# Patient Record
Sex: MALE
[De-identification: names, ages, dates, MRNs, and addresses within clinical notes are randomized per-mention and may not be internally consistent; named-entity substitution may affect disease eponyms.]

---

## 2017-08-16 ENCOUNTER — RESULT CHARGE (OUTPATIENT)
Age: 62
End: 2017-08-16

## 2017-08-17 ENCOUNTER — RX RENEWAL (OUTPATIENT)
Age: 62
End: 2017-08-17

## 2017-08-17 ENCOUNTER — APPOINTMENT (OUTPATIENT)
Dept: HEART AND VASCULAR | Facility: CLINIC | Age: 62
End: 2017-08-17
Payer: COMMERCIAL

## 2017-08-17 ENCOUNTER — LABORATORY RESULT (OUTPATIENT)
Age: 62
End: 2017-08-17

## 2017-08-17 VITALS
WEIGHT: 274.56 LBS | DIASTOLIC BLOOD PRESSURE: 88 MMHG | TEMPERATURE: 98.8 F | HEART RATE: 73 BPM | HEIGHT: 73.62 IN | BODY MASS INDEX: 35.61 KG/M2 | OXYGEN SATURATION: 94 % | SYSTOLIC BLOOD PRESSURE: 152 MMHG

## 2017-08-17 DIAGNOSIS — F15.90 OTHER STIMULANT USE, UNSPECIFIED, UNCOMPLICATED: ICD-10-CM

## 2017-08-17 DIAGNOSIS — E11.9 TYPE 2 DIABETES MELLITUS W/OUT COMPLICATIONS: ICD-10-CM

## 2017-08-17 DIAGNOSIS — Z82.49 FAMILY HISTORY OF ISCHEMIC HEART DISEASE AND OTHER DISEASES OF THE CIRCULATORY SYSTEM: ICD-10-CM

## 2017-08-17 DIAGNOSIS — Z80.9 FAMILY HISTORY OF MALIGNANT NEOPLASM, UNSPECIFIED: ICD-10-CM

## 2017-08-17 PROCEDURE — 99214 OFFICE O/P EST MOD 30 MIN: CPT | Mod: 25

## 2017-08-17 PROCEDURE — 93000 ELECTROCARDIOGRAM COMPLETE: CPT

## 2017-08-17 PROCEDURE — 36415 COLL VENOUS BLD VENIPUNCTURE: CPT

## 2017-08-17 RX ORDER — ASPIRIN ENTERIC COATED TABLETS 81 MG 81 MG/1
81 TABLET, DELAYED RELEASE ORAL DAILY
Qty: 90 | Refills: 0 | Status: ACTIVE | COMMUNITY
Start: 2017-08-17

## 2017-08-17 RX ORDER — CHLORHEXIDINE GLUCONATE, 0.12% ORAL RINSE 1.2 MG/ML
0.12 SOLUTION DENTAL
Qty: 473 | Refills: 0 | Status: DISCONTINUED | COMMUNITY
Start: 2017-02-16

## 2017-08-17 RX ORDER — METFORMIN HYDROCHLORIDE 1000 MG/1
1000 TABLET, COATED ORAL
Qty: 180 | Refills: 3 | Status: ACTIVE | COMMUNITY
Start: 2017-05-23 | End: 1900-01-01

## 2017-08-17 RX ORDER — GLIPIZIDE 10 MG/1
10 TABLET, EXTENDED RELEASE ORAL
Qty: 180 | Refills: 3 | Status: ACTIVE | COMMUNITY
Start: 2017-05-23 | End: 1900-01-01

## 2017-08-18 ENCOUNTER — RX RENEWAL (OUTPATIENT)
Age: 62
End: 2017-08-18

## 2017-08-18 LAB
ALBUMIN SERPL ELPH-MCNC: 4.6 G/DL
ALP BLD-CCNC: 44 U/L
ALT SERPL-CCNC: 19 U/L
ANION GAP SERPL CALC-SCNC: 17 MMOL/L
APPEARANCE: CLEAR
AST SERPL-CCNC: 20 U/L
BASOPHILS # BLD AUTO: 0.04 K/UL
BASOPHILS NFR BLD AUTO: 0.5 %
BILIRUB SERPL-MCNC: 0.4 MG/DL
BILIRUBIN URINE: NEGATIVE
BLOOD URINE: NEGATIVE
BUN SERPL-MCNC: 33 MG/DL
CALCIUM SERPL-MCNC: 9.7 MG/DL
CHLORIDE SERPL-SCNC: 99 MMOL/L
CHOLEST SERPL-MCNC: 200 MG/DL
CHOLEST/HDLC SERPL: 3.7 RATIO
CO2 SERPL-SCNC: 25 MMOL/L
COLOR: YELLOW
CREAT SERPL-MCNC: 1.47 MG/DL
EOSINOPHIL # BLD AUTO: 0.09 K/UL
EOSINOPHIL NFR BLD AUTO: 1.2 %
GLUCOSE QUALITATIVE U: NORMAL MG/DL
GLUCOSE SERPL-MCNC: 119 MG/DL
HBA1C MFR BLD HPLC: 9.1 %
HCT VFR BLD CALC: 45.4 %
HDLC SERPL-MCNC: 54 MG/DL
HGB BLD-MCNC: 15.1 G/DL
IMM GRANULOCYTES NFR BLD AUTO: 0.3 %
KETONES URINE: NEGATIVE
LDLC SERPL CALC-MCNC: 125 MG/DL
LEUKOCYTE ESTERASE URINE: NEGATIVE
LYMPHOCYTES # BLD AUTO: 1.7 K/UL
LYMPHOCYTES NFR BLD AUTO: 22.5 %
MAN DIFF?: NORMAL
MCHC RBC-ENTMCNC: 31 PG
MCHC RBC-ENTMCNC: 33.3 GM/DL
MCV RBC AUTO: 93.2 FL
MONOCYTES # BLD AUTO: 0.47 K/UL
MONOCYTES NFR BLD AUTO: 6.2 %
NEUTROPHILS # BLD AUTO: 5.22 K/UL
NEUTROPHILS NFR BLD AUTO: 69.3 %
NITRITE URINE: NEGATIVE
PH URINE: 5
PLATELET # BLD AUTO: 175 K/UL
POTASSIUM SERPL-SCNC: 5.1 MMOL/L
PROT SERPL-MCNC: 7.6 G/DL
PROTEIN URINE: 100 MG/DL
PSA SERPL-MCNC: 1.37 NG/ML
RBC # BLD: 4.87 M/UL
RBC # FLD: 13 %
SODIUM SERPL-SCNC: 141 MMOL/L
SPECIFIC GRAVITY URINE: 1.02
T3 SERPL-MCNC: 96 NG/DL
T4 SERPL-MCNC: 7.8 UG/DL
TRIGL SERPL-MCNC: 107 MG/DL
TSH SERPL-ACNC: 1.49 UIU/ML
UROBILINOGEN URINE: NORMAL MG/DL
WBC # FLD AUTO: 7.54 K/UL

## 2018-03-02 ENCOUNTER — RX RENEWAL (OUTPATIENT)
Age: 63
End: 2018-03-02

## 2018-03-02 RX ORDER — PEN NEEDLE, DIABETIC 29 G X1/2"
32G X 4 MM NEEDLE, DISPOSABLE MISCELLANEOUS
Qty: 180 | Refills: 3 | Status: ACTIVE | COMMUNITY
Start: 2017-05-23

## 2018-03-15 ENCOUNTER — RX RENEWAL (OUTPATIENT)
Age: 63
End: 2018-03-15

## 2018-03-15 RX ORDER — INSULIN DEGLUDEC INJECTION 100 U/ML
100 INJECTION, SOLUTION SUBCUTANEOUS
Qty: 45 | Refills: 1 | Status: ACTIVE | COMMUNITY
Start: 2017-06-20 | End: 1900-01-01

## 2019-07-08 ENCOUNTER — RX RENEWAL (OUTPATIENT)
Age: 64
End: 2019-07-08

## 2020-07-22 ENCOUNTER — NON-APPOINTMENT (OUTPATIENT)
Age: 65
End: 2020-07-22

## 2020-07-22 ENCOUNTER — APPOINTMENT (OUTPATIENT)
Dept: HEART AND VASCULAR | Facility: CLINIC | Age: 65
End: 2020-07-22
Payer: COMMERCIAL

## 2020-07-22 VITALS — SYSTOLIC BLOOD PRESSURE: 134 MMHG | DIASTOLIC BLOOD PRESSURE: 80 MMHG

## 2020-07-22 VITALS
HEIGHT: 73.62 IN | SYSTOLIC BLOOD PRESSURE: 150 MMHG | DIASTOLIC BLOOD PRESSURE: 80 MMHG | HEART RATE: 95 BPM | TEMPERATURE: 96.1 F | OXYGEN SATURATION: 95 % | WEIGHT: 259.99 LBS | BODY MASS INDEX: 33.72 KG/M2

## 2020-07-22 PROCEDURE — 36415 COLL VENOUS BLD VENIPUNCTURE: CPT

## 2020-07-22 PROCEDURE — 99214 OFFICE O/P EST MOD 30 MIN: CPT

## 2020-07-22 PROCEDURE — 93000 ELECTROCARDIOGRAM COMPLETE: CPT

## 2020-07-22 RX ORDER — VALSARTAN 80 MG/1
80 TABLET, COATED ORAL
Qty: 90 | Refills: 0 | Status: DISCONTINUED | COMMUNITY
Start: 2017-05-23 | End: 2020-07-22

## 2020-07-22 RX ORDER — DAPAGLIFLOZIN 10 MG/1
10 TABLET, FILM COATED ORAL
Refills: 0 | Status: ACTIVE | COMMUNITY

## 2020-07-22 RX ORDER — DULAGLUTIDE 1.5 MG/.5ML
1.5 INJECTION, SOLUTION SUBCUTANEOUS
Refills: 0 | Status: ACTIVE | COMMUNITY

## 2020-07-23 LAB — PSA SERPL-MCNC: 1.46 NG/ML

## 2020-07-29 ENCOUNTER — TRANSCRIPTION ENCOUNTER (OUTPATIENT)
Age: 65
End: 2020-07-29

## 2020-07-30 ENCOUNTER — TRANSCRIPTION ENCOUNTER (OUTPATIENT)
Age: 65
End: 2020-07-30

## 2020-07-30 ENCOUNTER — NON-APPOINTMENT (OUTPATIENT)
Age: 65
End: 2020-07-30

## 2020-08-12 ENCOUNTER — APPOINTMENT (OUTPATIENT)
Dept: CT IMAGING | Facility: CLINIC | Age: 65
End: 2020-08-12
Payer: SELF-PAY

## 2020-08-12 ENCOUNTER — OUTPATIENT (OUTPATIENT)
Dept: OUTPATIENT SERVICES | Facility: HOSPITAL | Age: 65
LOS: 1 days | End: 2020-08-12

## 2020-08-12 ENCOUNTER — RESULT REVIEW (OUTPATIENT)
Age: 65
End: 2020-08-12

## 2020-08-12 PROCEDURE — 75571 CT HRT W/O DYE W/CA TEST: CPT | Mod: 26

## 2020-08-13 ENCOUNTER — APPOINTMENT (OUTPATIENT)
Dept: HEART AND VASCULAR | Facility: CLINIC | Age: 65
End: 2020-08-13
Payer: COMMERCIAL

## 2020-08-13 DIAGNOSIS — Z00.00 ENCOUNTER FOR GENERAL ADULT MEDICAL EXAMINATION W/OUT ABNORMAL FINDINGS: ICD-10-CM

## 2020-08-13 PROCEDURE — 99211 OFF/OP EST MAY X REQ PHY/QHP: CPT

## 2020-08-13 PROCEDURE — 36415 COLL VENOUS BLD VENIPUNCTURE: CPT

## 2020-08-14 LAB
SARS-COV-2 IGG SERPL IA-ACNC: 0.12 INDEX
SARS-COV-2 IGG SERPL QL IA: NEGATIVE

## 2020-08-16 LAB — SARS-COV-2 N GENE NPH QL NAA+PROBE: NOT DETECTED

## 2020-09-09 ENCOUNTER — APPOINTMENT (OUTPATIENT)
Dept: HEART AND VASCULAR | Facility: CLINIC | Age: 65
End: 2020-09-09
Payer: COMMERCIAL

## 2020-09-09 VITALS — WEIGHT: 259.99 LBS | BODY MASS INDEX: 51.04 KG/M2 | HEIGHT: 60 IN

## 2020-09-09 PROCEDURE — A9500: CPT

## 2020-09-09 PROCEDURE — 93306 TTE W/DOPPLER COMPLETE: CPT

## 2020-09-09 PROCEDURE — 93015 CV STRESS TEST SUPVJ I&R: CPT

## 2020-09-09 PROCEDURE — 78452 HT MUSCLE IMAGE SPECT MULT: CPT

## 2020-09-09 PROCEDURE — ZZZZZ: CPT

## 2020-10-06 ENCOUNTER — MED ADMIN CHARGE (OUTPATIENT)
Age: 65
End: 2020-10-06

## 2020-10-06 ENCOUNTER — APPOINTMENT (OUTPATIENT)
Dept: HEART AND VASCULAR | Facility: CLINIC | Age: 65
End: 2020-10-06
Payer: COMMERCIAL

## 2020-10-06 DIAGNOSIS — Z23 ENCOUNTER FOR IMMUNIZATION: ICD-10-CM

## 2020-10-06 PROCEDURE — G0009: CPT

## 2020-10-06 PROCEDURE — 90670 PCV13 VACCINE IM: CPT

## 2020-10-06 PROCEDURE — 36415 COLL VENOUS BLD VENIPUNCTURE: CPT

## 2020-10-06 NOTE — PHYSICAL EXAM
[General Appearance - Well Developed] : well developed [Normal Appearance] : normal appearance [Well Groomed] : well groomed [No Deformities] : no deformities [General Appearance - In No Acute Distress] : no acute distress [General Appearance - Well Nourished] : well nourished [Eyelids - No Xanthelasma] : the eyelids demonstrated no xanthelasmas [Normal Conjunctiva] : the conjunctiva exhibited no abnormalities [Respiration, Rhythm And Depth] : normal respiratory rhythm and effort [Heart Rate And Rhythm] : heart rate and rhythm were normal [Auscultation Breath Sounds / Voice Sounds] : lungs were clear to auscultation bilaterally [Exaggerated Use Of Accessory Muscles For Inspiration] : no accessory muscle use [Abnormal Walk] : normal gait [Heart Sounds] : normal S1 and S2 [Murmurs] : no murmurs present [Gait - Sufficient For Exercise Testing] : the gait was sufficient for exercise testing [Nail Clubbing] : no clubbing of the fingernails [Cyanosis, Localized] : no localized cyanosis [Petechial Hemorrhages (___cm)] : no petechial hemorrhages [No Venous Stasis] : no venous stasis [Skin Color & Pigmentation] : normal skin color and pigmentation [No Skin Ulcers] : no skin ulcer [Skin Lesions] : no skin lesions [Oriented To Time, Place, And Person] : oriented to person, place, and time [No Xanthoma] : no  xanthoma was observed [Affect] : the affect was normal [No Anxiety] : not feeling anxious [Mood] : the mood was normal [Abdomen Soft] : soft [Abdomen Tenderness] : non-tender [] : no hepato-splenomegaly [Abdomen Mass (___ Cm)] : no abdominal mass palpated [FreeTextEntry1] : TRAVIS 7/22/20, Prostate mildly enlarged, G (-)

## 2020-10-06 NOTE — ASSESSMENT
[FreeTextEntry1] : DM: on max metformin and glipizide and long acting insulin, will f/u a1c, 2/2016 a1c 6.9.  Seeing an Endo, Dr Hooks.  Last  A1c 6.9.  Pt agrees to a CCS\par \par HTN: uncontrolled today, goal <130/90 given CKD, review bw, if creatinine stable increase valsartan to 160mg and follow up in 2 weeks. Diet and exercise discussed in detail. orthostatics negative.  BP elevated here but home readings very good. No Change\par \par CKD: f/u BW, needs better bp control, 2/2016 creatinine 1.39 gfr 55. Cr 1.57. \par \par CA screening: colonoscopy approx 2015 Dr. Murray Greenfield, at [facility], normal. Mother had colon CA.  TRAVIS and PSA .  \par \par hEALTH MAINTAINENCE- PREVNAR 13 GIVEN 10/6/20, pNEUMOVAX IN 1 YR

## 2020-10-06 NOTE — HISTORY OF PRESENT ILLNESS
[FreeTextEntry1] : 62 yo male, last visit over year ago, here today for follow up. Hx of htn, dm, hld, ckd on medical therapy. Does home BP monitor majority >130/90. Denies CP, SOB, palpitations, orthopnea, LE swelling, dizziness, syncope. No formal exercise but walks daily, can walk up subway stairs- two flights, inclines without provoking any concerning cardiac sx. Reports one episode of dizziness from sitting to standing, no recurrence since, no ha, vision changes, limb weakness, focal neurological deficits. \par \par Previous nuc >5 years ago with Dr. Saez, reports it was normal. \par \par 7/22/20  Last here 3 yrs ago, pt feels thickening of his tongue and cheeks. Has elevated insulin like growth factor(Acromegaly).  Followed by Dr Juan Hooks.  Must also consider Amyloidosis.  Home BPs are nl but a few are high.  Cr 1.57

## 2020-10-08 LAB
ALBUMIN MFR SERPL ELPH: 59.2 %
ALBUMIN SERPL-MCNC: 4.3 G/DL
ALBUMIN/GLOB SERPL: 1.4 RATIO
ALPHA1 GLOB MFR SERPL ELPH: 4.4 %
ALPHA1 GLOB SERPL ELPH-MCNC: 0.3 G/DL
ALPHA2 GLOB MFR SERPL ELPH: 8.2 %
ALPHA2 GLOB SERPL ELPH-MCNC: 0.6 G/DL
B-GLOBULIN MFR SERPL ELPH: 12.5 %
B-GLOBULIN SERPL ELPH-MCNC: 0.9 G/DL
ESTIMATED AVERAGE GLUCOSE: 134 MG/DL
GAMMA GLOB FLD ELPH-MCNC: 1.1 G/DL
GAMMA GLOB MFR SERPL ELPH: 15.7 %
HBA1C MFR BLD HPLC: 6.3 %
INTERPRETATION SERPL IEP-IMP: NORMAL
M PROTEIN SPEC IFE-MCNC: NORMAL
PROT SERPL-MCNC: 7.3 G/DL
PROT SERPL-MCNC: 7.3 G/DL

## 2022-05-17 ENCOUNTER — APPOINTMENT (OUTPATIENT)
Dept: HEART AND VASCULAR | Facility: CLINIC | Age: 67
End: 2022-05-17
Payer: COMMERCIAL

## 2022-05-17 ENCOUNTER — NON-APPOINTMENT (OUTPATIENT)
Age: 67
End: 2022-05-17

## 2022-05-17 VITALS
BODY MASS INDEX: 34.65 KG/M2 | DIASTOLIC BLOOD PRESSURE: 92 MMHG | OXYGEN SATURATION: 95 % | WEIGHT: 270 LBS | HEIGHT: 74 IN | SYSTOLIC BLOOD PRESSURE: 157 MMHG | HEART RATE: 80 BPM

## 2022-05-17 PROCEDURE — 93000 ELECTROCARDIOGRAM COMPLETE: CPT

## 2022-05-17 PROCEDURE — 99214 OFFICE O/P EST MOD 30 MIN: CPT

## 2022-05-17 RX ORDER — ATORVASTATIN CALCIUM 40 MG/1
40 TABLET, FILM COATED ORAL
Qty: 1 | Refills: 3 | Status: ACTIVE | COMMUNITY
Start: 2017-05-23

## 2022-05-17 NOTE — HISTORY OF PRESENT ILLNESS
[FreeTextEntry1] : 65 yo male, last visit over year ago, here today for follow up. Hx of htn, dm, hld, ckd on medical therapy. Does home BP monitor majority >130/90. Denies CP, SOB, palpitations, orthopnea, LE swelling, dizziness, syncope. No formal exercise but walks daily, can walk up subway stairs- two flights, inclines without provoking any concerning cardiac sx. Reports one episode of dizziness from sitting to standing, no recurrence since, no ha, vision changes, limb weakness, focal neurological deficits. \par \par Previous nuc >5 years ago with Dr. Saez, reports it was normal. \par \par 7/22/20  Last here 3 yrs ago, pt feels thickening of his tongue and cheeks. Has elevated insulin like growth factor(Acromegaly).  Followed by Dr Juan Hooks.  Must also consider Amyloidosis.  Home BPs are nl but a few are high.  Cr 1.57\par 5/17/22  w/u by Dr Hooks and ENT (-).  A1c 7.8, , Cr 1.50, gluc 204, K 5.4

## 2022-05-17 NOTE — REASON FOR VISIT
[FreeTextEntry1] : Follow up multiple chronic health problems \par \par Endo- Dr Juan Hooks\par Renal- Dr Watt

## 2022-05-17 NOTE — PHYSICAL EXAM
[General Appearance - Well Developed] : well developed [Normal Appearance] : normal appearance [Well Groomed] : well groomed [General Appearance - Well Nourished] : well nourished [No Deformities] : no deformities [General Appearance - In No Acute Distress] : no acute distress [Normal Conjunctiva] : the conjunctiva exhibited no abnormalities [Eyelids - No Xanthelasma] : the eyelids demonstrated no xanthelasmas [Respiration, Rhythm And Depth] : normal respiratory rhythm and effort [Exaggerated Use Of Accessory Muscles For Inspiration] : no accessory muscle use [Auscultation Breath Sounds / Voice Sounds] : lungs were clear to auscultation bilaterally [Heart Rate And Rhythm] : heart rate and rhythm were normal [Heart Sounds] : normal S1 and S2 [Murmurs] : no murmurs present [Abdomen Soft] : soft [Abdomen Tenderness] : non-tender [Abdomen Mass (___ Cm)] : no abdominal mass palpated [Abnormal Walk] : normal gait [Gait - Sufficient For Exercise Testing] : the gait was sufficient for exercise testing [Nail Clubbing] : no clubbing of the fingernails [Cyanosis, Localized] : no localized cyanosis [Petechial Hemorrhages (___cm)] : no petechial hemorrhages [Skin Color & Pigmentation] : normal skin color and pigmentation [] : no rash [No Venous Stasis] : no venous stasis [Skin Lesions] : no skin lesions [No Skin Ulcers] : no skin ulcer [No Xanthoma] : no  xanthoma was observed [Oriented To Time, Place, And Person] : oriented to person, place, and time [Affect] : the affect was normal [Mood] : the mood was normal [No Anxiety] : not feeling anxious [FreeTextEntry1] : TRAVIS 7/22/20, Prostate mildly enlarged, G (-)

## 2022-05-17 NOTE — ASSESSMENT
[FreeTextEntry1] : DM: on max metformin and glipizide and long acting insulin, will f/u a1c, 2/2016 a1c 6.9.  Seeing an Endo, Dr Hooks.  Last  A1c 6.9.  Pt agrees to a CCS + 600 in 2020.  NL Nuc Sept 2020.  A1c 7.8. \par \par HLD-  on Lipitor 40 mg.  Will add Zetia 10 mg \par \par HTN: uncontrolled today, goal <130/90 given CKD, review bw, if creatinine stable increase valsartan to 160mg and follow up in 2 weeks. Diet and exercise discussed in detail. orthostatics negative.  BP elevated here home readings variable 120s to 150.  Add weekly HCTZ 25 mg\par \par CKD: f/u BW, needs better bp control, 2/2016 creatinine 1.39 gfr 55. Cr 1.57, 1.51 in 2022. \par \par CA screening: colonoscopy approx 2015 Dr. Murray Greenfield, at [facility], normal. Mother had colon CA.  TRAVIS and PSA .  Updated with Dr Shiv Coburn in 2021 @ [facility]\par \par hEALTH MAINTAINENCE- PREVNAR 13 GIVEN 10/6/20, PNEUMOVAX IN 1 YR, had 2 covid boosters

## 2022-09-20 ENCOUNTER — APPOINTMENT (OUTPATIENT)
Dept: HEART AND VASCULAR | Facility: CLINIC | Age: 67
End: 2022-09-20

## 2022-09-20 VITALS
TEMPERATURE: 96.8 F | WEIGHT: 263.03 LBS | OXYGEN SATURATION: 96 % | DIASTOLIC BLOOD PRESSURE: 80 MMHG | BODY MASS INDEX: 33.76 KG/M2 | SYSTOLIC BLOOD PRESSURE: 144 MMHG | HEART RATE: 74 BPM | HEIGHT: 74 IN

## 2022-09-20 DIAGNOSIS — N18.30 CHRONIC KIDNEY DISEASE, STAGE 3 UNSPECIFIED: ICD-10-CM

## 2022-09-20 PROCEDURE — 36415 COLL VENOUS BLD VENIPUNCTURE: CPT

## 2022-09-20 PROCEDURE — 99214 OFFICE O/P EST MOD 30 MIN: CPT | Mod: 25

## 2022-09-20 NOTE — REASON FOR VISIT
[FreeTextEntry1] : Follow up multiple chronic health problems \par \par Endo- Dr Juan Hooks\par Renal- Dr Watt\par Ophtho-

## 2022-09-20 NOTE — HISTORY OF PRESENT ILLNESS
[FreeTextEntry1] : 66 yo male, last visit over year ago, here today for follow up. Hx of htn, dm, hld, ckd on medical therapy. Does home BP monitor majority >130/90. Denies CP, SOB, palpitations, orthopnea, LE swelling, dizziness, syncope. No formal exercise but walks daily, can walk up subway stairs- two flights, inclines without provoking any concerning cardiac sx. Reports one episode of dizziness from sitting to standing, no recurrence since, no ha, vision changes, limb weakness, focal neurological deficits. \par \par Previous nuc >5 years ago with Dr. Saez, reports it was normal. \par \par 7/22/20  Last here 3 yrs ago, pt feels thickening of his tongue and cheeks. Has elevated insulin like growth factor(Acromegaly).  Followed by Dr Juan Hooks.  Must also consider Amyloidosis.  Home BPs are nl but a few are high.  Cr 1.57\par 5/17/22  w/u by Dr Hooks and ENT (-).  A1c 7.8, , Cr 1.50, gluc 204, K 5.4\par 9/20/22 BP up on once weekly HCTZ/

## 2022-09-20 NOTE — ASSESSMENT
[FreeTextEntry1] : DM: on max metformin and glipizide and long acting insulin, will f/u a1c, 2/2016 a1c 6.9.  Seeing an Endo, Dr Hooks.  Last  A1c 6.9.  Pt agrees to a CCS + 600 in 2020.  NL Nuc Sept 2020.  A1c 7.8. Good exercise tolerance. Defer Nuc.\par \par HLD-  on Lipitor 40 mg.  Will add Zetia 10 mg \par \par HTN: uncontrolled today, goal <130/90 given CKD, review bw, if creatinine stable increase valsartan to 160mg and follow up in 2 weeks. Diet and exercise discussed in detail. orthostatics negative.  BP elevated here home readings variable 120s to 150.  Add weekly HCTZ 25 mg, increase to 2 x weekly 9/20/22\par \par CKD: f/u BW, needs better BP control, 2/2016 creatinine 1.39 GFR 55. Cr 1.57, 1.51 in 2022.   Labs drawn in office.\par \par CA screening: colonoscopy approx 2015 Dr. Murray Greenfield, at [facility], normal. Mother had colon CA.  TRAVIS and PSA .  Updated with Dr Shiv Coburn in 2021 @ [facility]\par \par HEALTH MAINTAINENCE- PREVNAR 13 GIVEN 10/6/20, PNEUMOVAX IN 1 YR, had 2 covid boosters, had new Covid Booster,  Shingrix to be completed..\par \par iRBBB/LAHB- stable

## 2022-09-21 LAB
ALBUMIN SERPL ELPH-MCNC: 4.9 G/DL
ALP BLD-CCNC: 54 U/L
ALT SERPL-CCNC: 30 U/L
ANION GAP SERPL CALC-SCNC: 16 MMOL/L
AST SERPL-CCNC: 24 U/L
BASOPHILS # BLD AUTO: 0.06 K/UL
BASOPHILS NFR BLD AUTO: 1 %
BILIRUB SERPL-MCNC: 0.3 MG/DL
BUN SERPL-MCNC: 32 MG/DL
CALCIUM SERPL-MCNC: 10.1 MG/DL
CHLORIDE SERPL-SCNC: 100 MMOL/L
CHOLEST SERPL-MCNC: 118 MG/DL
CO2 SERPL-SCNC: 22 MMOL/L
CREAT SERPL-MCNC: 1.54 MG/DL
EGFR: 50 ML/MIN/1.73M2
EOSINOPHIL # BLD AUTO: 0.22 K/UL
EOSINOPHIL NFR BLD AUTO: 3.5 %
ESTIMATED AVERAGE GLUCOSE: 177 MG/DL
GLUCOSE SERPL-MCNC: 114 MG/DL
HBA1C MFR BLD HPLC: 7.8 %
HCT VFR BLD CALC: 50.3 %
HDLC SERPL-MCNC: 42 MG/DL
HGB BLD-MCNC: 16.5 G/DL
IMM GRANULOCYTES NFR BLD AUTO: 0.2 %
LDLC SERPL CALC-MCNC: 58 MG/DL
LYMPHOCYTES # BLD AUTO: 1.57 K/UL
LYMPHOCYTES NFR BLD AUTO: 25.3 %
MAN DIFF?: NORMAL
MCHC RBC-ENTMCNC: 32.4 PG
MCHC RBC-ENTMCNC: 32.8 GM/DL
MCV RBC AUTO: 98.8 FL
MONOCYTES # BLD AUTO: 0.44 K/UL
MONOCYTES NFR BLD AUTO: 7.1 %
NEUTROPHILS # BLD AUTO: 3.91 K/UL
NEUTROPHILS NFR BLD AUTO: 62.9 %
NONHDLC SERPL-MCNC: 76 MG/DL
PLATELET # BLD AUTO: 139 K/UL
POTASSIUM SERPL-SCNC: 4.8 MMOL/L
PROT SERPL-MCNC: 7.4 G/DL
PSA SERPL-MCNC: 1.22 NG/ML
RBC # BLD: 5.09 M/UL
RBC # FLD: 12.6 %
SODIUM SERPL-SCNC: 138 MMOL/L
T3 SERPL-MCNC: 92 NG/DL
T4 SERPL-MCNC: 7.3 UG/DL
TRIGL SERPL-MCNC: 89 MG/DL
TSH SERPL-ACNC: 1.59 UIU/ML
WBC # FLD AUTO: 6.21 K/UL

## 2023-01-23 ENCOUNTER — APPOINTMENT (OUTPATIENT)
Dept: HEART AND VASCULAR | Facility: CLINIC | Age: 68
End: 2023-01-23
Payer: MEDICARE

## 2023-01-23 VITALS
HEIGHT: 74 IN | BODY MASS INDEX: 34.14 KG/M2 | DIASTOLIC BLOOD PRESSURE: 78 MMHG | TEMPERATURE: 98.4 F | HEART RATE: 78 BPM | OXYGEN SATURATION: 96 % | WEIGHT: 266 LBS | SYSTOLIC BLOOD PRESSURE: 149 MMHG

## 2023-01-23 PROCEDURE — 99214 OFFICE O/P EST MOD 30 MIN: CPT

## 2023-01-23 NOTE — ASSESSMENT
[FreeTextEntry1] : DM: on max metformin and glipizide and long acting insulin, will f/u a1c, 2/2016 a1c 6.9.  Seeing an Endo, Dr Hooks.  Last  A1c7.9 .  Pt to forward labs.\par \par AZSHD-  Pt agrees to a CCS + 600 in 2020.  NL Nuc Sept 2020.  A1c 7.8. Good exercise tolerance. Nuc at end of 2023..\par \par HLD-  on Lipitor 40 mg.  Will add Zetia 10 mg \par \par HTN: uncontrolled today, goal <130/90 given CKD, review bw, if creatinine stable increase valsartan to 160mg and follow up in 2 weeks. Diet and exercise discussed in detail. orthostatics negative.  BP elevated here home readings variable 120s to 150.  Add weekly HCTZ 25 mg, increase to 2 x weekly 9/20/22  Added Norvasc 2.5 mg today 1/23/23\par \par CKD: f/u BW, needs better BP control, 2/2016 creatinine 1.39 GFR 55. Cr 1.57, 1.51 in 2022.   Labs drawn with Endo\par CA screening: colonoscopy approx 2015 Dr. Murray Greenfield, at [facility], normal. Mother had colon CA.  TRAVIS and PSA .  Updated with Dr Shiv Coburn in 2021 @ [facility]\par \par HEALTH MAINTAINENCE- PREVNAR 13 GIVEN 10/6/20, PNEUMOVAX IN 1 YR, had 2 covid boosters, had new Covid Booster,  Shingrix  completed..\par \par iRBBB/LAHB- stable

## 2023-01-23 NOTE — HISTORY OF PRESENT ILLNESS
[FreeTextEntry1] : 68 yo male, last visit over year ago, here today for follow up. Hx of htn, dm, hld, ckd on medical therapy. Does home BP monitor majority >130/90. Denies CP, SOB, palpitations, orthopnea, LE swelling, dizziness, syncope. No formal exercise but walks daily, can walk up subway stairs- two flights, inclines without provoking any concerning cardiac sx. Reports one episode of dizziness from sitting to standing, no recurrence since, no ha, vision changes, limb weakness, focal neurological deficits. \par \par Previous nuc >5 years ago with Dr. Saez, reports it was normal. \par \par 7/22/20  Last here 3 yrs ago, pt feels thickening of his tongue and cheeks. Has elevated insulin like growth factor(Acromegaly).  Followed by Dr Juan Hooks.  Must also consider Amyloidosis.  Home BPs are nl but a few are high.  Cr 1.57\par 5/17/22  w/u by Dr Hooks and ENT (-).  A1c 7.8, , Cr 1.50, gluc 204, K 5.4\par 9/20/22 BP up on once weekly HCTZ/\par 1/23/23 A1c 7.8 and 7.9.

## 2023-04-24 ENCOUNTER — RX RENEWAL (OUTPATIENT)
Age: 68
End: 2023-04-24

## 2023-05-31 ENCOUNTER — RESULT CHARGE (OUTPATIENT)
Age: 68
End: 2023-05-31

## 2023-06-01 ENCOUNTER — APPOINTMENT (OUTPATIENT)
Dept: HEART AND VASCULAR | Facility: CLINIC | Age: 68
End: 2023-06-01
Payer: MEDICARE

## 2023-06-01 ENCOUNTER — NON-APPOINTMENT (OUTPATIENT)
Age: 68
End: 2023-06-01

## 2023-06-01 VITALS
BODY MASS INDEX: 33.75 KG/M2 | WEIGHT: 263 LBS | OXYGEN SATURATION: 95 % | DIASTOLIC BLOOD PRESSURE: 80 MMHG | SYSTOLIC BLOOD PRESSURE: 148 MMHG | HEART RATE: 78 BPM | HEIGHT: 74 IN

## 2023-06-01 VITALS
DIASTOLIC BLOOD PRESSURE: 80 MMHG | SYSTOLIC BLOOD PRESSURE: 148 MMHG | HEIGHT: 74 IN | BODY MASS INDEX: 33.75 KG/M2 | OXYGEN SATURATION: 95 % | HEART RATE: 78 BPM | WEIGHT: 263 LBS | TEMPERATURE: 98.1 F

## 2023-06-01 PROCEDURE — 99214 OFFICE O/P EST MOD 30 MIN: CPT

## 2023-06-01 NOTE — ASSESSMENT
[FreeTextEntry1] : DM: on max metformin and glipizide and long acting insulin, will f/u a1c, 2/2016 a1c 6.9.  Seeing an Endo, Dr Hooks.  Last  A1c 7.9 .  Pt to forward labs.\par \par ASHD-  Pt agrees to a CCS + 600 in 2020.  NL Nuc Sept 2020.  A1c 7.8. Good exercise tolerance. Nuc at end of 2023..\par \par HLD-  on Lipitor 40 mg.  Will add Zetia 10 mg \par \par HTN: uncontrolled today, goal <130/90 given CKD, review bw, if creatinine stable increase valsartan to 160mg and follow up in 2 weeks. Diet and exercise discussed in detail. orthostatics negative.  BP elevated here home readings variable 120s to 150.  Add weekly HCTZ 25 mg, increase to 2 x weekly 9/20/22  Added Norvasc 2.5 mg today 1/23/23.  Increased to 5 mg 6/1/23\par \par CKD: f/u BW, needs better BP control, 2/2016 creatinine 1.39 GFR 55. Cr 1.57, 1.51 in 2022.   Labs drawn with Endo\par CA screening: colonoscopy approx 2015 Dr. Murray Greenfield, at [facility], normal. Mother had colon CA.  TRAVIS and PSA .  Updated with Dr Shiv Coburn in 2021 @ [facility]\par \par HEALTH MAINTAINENCE- PREVNAR 13 GIVEN 10/6/20, PNEUMOVAX IN 1 YR, had 2 covid boosters, had new Covid Booster,  Shingrix  completed..\par \par iRBBB/LAHB- stable

## 2023-06-01 NOTE — HISTORY OF PRESENT ILLNESS
[FreeTextEntry1] : 66 yo male, last visit over year ago, here today for follow up. Hx of htn, dm, hld, ckd on medical therapy. Does home BP monitor majority >130/90. Denies CP, SOB, palpitations, orthopnea, LE swelling, dizziness, syncope. No formal exercise but walks daily, can walk up subway stairs- two flights, inclines without provoking any concerning cardiac sx. Reports one episode of dizziness from sitting to standing, no recurrence since, no ha, vision changes, limb weakness, focal neurological deficits. \par \par Previous nuc >5 years ago with Dr. Saez, reports it was normal. \par \par 7/22/20  Last here 3 yrs ago, pt feels thickening of his tongue and cheeks. Has elevated insulin like growth factor(Acromegaly).  Followed by Dr Juan Hooks.  Must also consider Amyloidosis.  Home BPs are nl but a few are high.  Cr 1.57\par 5/17/22  w/u by Dr Hooks and ENT (-).  A1c 7.8, , Cr 1.50, gluc 204, K 5.4\par 9/20/22 BP up on once weekly HCTZ/\par 1/23/23 A1c 7.8 and 7.9. \par 6/1/23 Wt unchanged, BP remains elevated

## 2023-08-31 ENCOUNTER — TRANSCRIPTION ENCOUNTER (OUTPATIENT)
Age: 68
End: 2023-08-31

## 2023-08-31 RX ORDER — OMEPRAZOLE 40 MG/1
40 CAPSULE, DELAYED RELEASE ORAL
Qty: 90 | Refills: 3 | Status: ACTIVE | COMMUNITY
Start: 2022-05-17 | End: 1900-01-01

## 2023-10-31 ENCOUNTER — RX RENEWAL (OUTPATIENT)
Age: 68
End: 2023-10-31

## 2023-10-31 RX ORDER — HYDROCHLOROTHIAZIDE 25 MG/1
25 TABLET ORAL
Qty: 26 | Refills: 3 | Status: ACTIVE | COMMUNITY
Start: 2022-05-17 | End: 1900-01-01

## 2023-11-07 ENCOUNTER — APPOINTMENT (OUTPATIENT)
Dept: HEART AND VASCULAR | Facility: CLINIC | Age: 68
End: 2023-11-07

## 2023-11-14 ENCOUNTER — APPOINTMENT (OUTPATIENT)
Dept: HEART AND VASCULAR | Facility: CLINIC | Age: 68
End: 2023-11-14
Payer: MEDICARE

## 2023-11-14 DIAGNOSIS — R93.1 ABNORMAL FINDINGS ON DIAGNOSTIC IMAGING OF HEART AND CORONARY CIRCULATION: ICD-10-CM

## 2023-11-14 PROCEDURE — 93015 CV STRESS TEST SUPVJ I&R: CPT

## 2023-12-04 ENCOUNTER — RX RENEWAL (OUTPATIENT)
Age: 68
End: 2023-12-04

## 2023-12-04 RX ORDER — EZETIMIBE 10 MG/1
10 TABLET ORAL
Qty: 90 | Refills: 3 | Status: ACTIVE | COMMUNITY
Start: 2022-05-17 | End: 1900-01-01

## 2024-03-14 ENCOUNTER — APPOINTMENT (OUTPATIENT)
Dept: HEART AND VASCULAR | Facility: CLINIC | Age: 69
End: 2024-03-14
Payer: MEDICARE

## 2024-03-14 VITALS
DIASTOLIC BLOOD PRESSURE: 80 MMHG | TEMPERATURE: 98.7 F | HEIGHT: 74 IN | OXYGEN SATURATION: 97 % | WEIGHT: 270 LBS | BODY MASS INDEX: 34.65 KG/M2 | SYSTOLIC BLOOD PRESSURE: 164 MMHG | HEART RATE: 89 BPM

## 2024-03-14 DIAGNOSIS — E11.9 TYPE 2 DIABETES MELLITUS W/OUT COMPLICATIONS: ICD-10-CM

## 2024-03-14 DIAGNOSIS — E78.00 PURE HYPERCHOLESTEROLEMIA, UNSPECIFIED: ICD-10-CM

## 2024-03-14 DIAGNOSIS — I45.2 BIFASCICULAR BLOCK: ICD-10-CM

## 2024-03-14 DIAGNOSIS — I10 ESSENTIAL (PRIMARY) HYPERTENSION: ICD-10-CM

## 2024-03-14 DIAGNOSIS — I25.10 ATHEROSCLEROTIC HEART DISEASE OF NATIVE CORONARY ARTERY W/OUT ANGINA PECTORIS: ICD-10-CM

## 2024-03-14 PROCEDURE — 36415 COLL VENOUS BLD VENIPUNCTURE: CPT

## 2024-03-14 PROCEDURE — 99214 OFFICE O/P EST MOD 30 MIN: CPT | Mod: 25

## 2024-03-14 RX ORDER — VALSARTAN 80 MG/1
80 TABLET, COATED ORAL DAILY
Qty: 90 | Refills: 3 | Status: ACTIVE | COMMUNITY
Start: 2024-03-14 | End: 1900-01-01

## 2024-03-14 NOTE — PHYSICAL EXAM
[General Appearance - Well Developed] : well developed [Well Groomed] : well groomed [Normal Appearance] : normal appearance [General Appearance - Well Nourished] : well nourished [No Deformities] : no deformities [General Appearance - In No Acute Distress] : no acute distress [Normal Conjunctiva] : the conjunctiva exhibited no abnormalities [Eyelids - No Xanthelasma] : the eyelids demonstrated no xanthelasmas [Respiration, Rhythm And Depth] : normal respiratory rhythm and effort [Exaggerated Use Of Accessory Muscles For Inspiration] : no accessory muscle use [Auscultation Breath Sounds / Voice Sounds] : lungs were clear to auscultation bilaterally [Heart Rate And Rhythm] : heart rate and rhythm were normal [Heart Sounds] : normal S1 and S2 [Murmurs] : no murmurs present [Abdomen Soft] : soft [Abdomen Tenderness] : non-tender [Abdomen Mass (___ Cm)] : no abdominal mass palpated [Abnormal Walk] : normal gait [Gait - Sufficient For Exercise Testing] : the gait was sufficient for exercise testing [Cyanosis, Localized] : no localized cyanosis [Nail Clubbing] : no clubbing of the fingernails [Petechial Hemorrhages (___cm)] : no petechial hemorrhages [] : no rash [No Venous Stasis] : no venous stasis [Skin Color & Pigmentation] : normal skin color and pigmentation [Skin Lesions] : no skin lesions [No Skin Ulcers] : no skin ulcer [Oriented To Time, Place, And Person] : oriented to person, place, and time [No Xanthoma] : no  xanthoma was observed [Mood] : the mood was normal [Affect] : the affect was normal [No Anxiety] : not feeling anxious [FreeTextEntry1] : TRAVIS 7/22/20, Prostate mildly enlarged, G (-)

## 2024-03-14 NOTE — REASON FOR VISIT
[FreeTextEntry1] : Follow up multiple chronic health problems   Endo- Dr Juan Hooks Renal- Dr Shukri Leonardo- Dr Rivera

## 2024-03-14 NOTE — HISTORY OF PRESENT ILLNESS
[FreeTextEntry1] : 68 yo male, last visit over year ago, here today for follow up. Hx of htn, dm, hld, ckd on medical therapy. Does home BP monitor majority >130/90. Denies CP, SOB, palpitations, orthopnea, LE swelling, dizziness, syncope. No formal exercise but walks daily, can walk up subway stairs- two flights, inclines without provoking any concerning cardiac sx. Reports one episode of dizziness from sitting to standing, no recurrence since, no ha, vision changes, limb weakness, focal neurological deficits.   Previous nuc >5 years ago with Dr. Saez, reports it was normal.   7/22/20  Last here 3 yrs ago, pt feels thickening of his tongue and cheeks. Has elevated insulin like growth factor(Acromegaly).  Followed by Dr Juan Hooks.  Must also consider Amyloidosis.  Home BPs are nl but a few are high.  Cr 1.57 5/17/22  w/u by Dr Hooks and ENT (-).  A1c 7.8, , Cr 1.50, gluc 204, K 5.4 9/20/22 BP up on once weekly HCTZ/ 1/23/23 A1c 7.8 and 7.9.  6/1/23 Wt unchanged, BP remains elevated 3/14/24 BP up

## 2024-03-14 NOTE — ASSESSMENT
[FreeTextEntry1] : DM: on max metformin and glipizide and long acting insulin, will f/u a1c, 2/2016 a1c 6.9.  Seeing an Endo, Dr Hooks.  Last  A1c 7.9 .  Labs were drawn today in Office.   ASHD-  Pt agrees to a CCS + 600 in 2020.  NL Nuc Sept 2020.  A1c 7.8. Good exercise tolerance. Nuc at end of 2023..  HLD-  on Lipitor 40 mg.  Will add Zetia 10 mg   HTN: uncontrolled today, goal <130/90 given CKD, review bw, if creatinine stable increase valsartan to 160mg and follow up in 2 weeks. Diet and exercise discussed in detail. orthostatics negative.  BP elevated here home readings variable 120s to 150.  Add weekly HCTZ 25 mg, increase to 2 x weekly 9/20/22  Added Norvasc 2.5 mg today 1/23/23.  Increased to 5 mg 6/1/23.  BP remains elevated, increase Valsartan to 160 + 80 mg.  CKD: f/u BW, needs better BP control, 2/2016 creatinine 1.39 GFR 55. Cr 1.57, 1.51 in 2022.   Labs drawn with Endo and us.  Labs were drawn today in Office.  CA screening: colonoscopy approx 2015 Dr. Murray Greenfield, at [facility], normal. Mother had colon CA.  TRAVIS and PSA .  Updated with Dr Shiv Coburn in 2021 @ [facility]  HEALTH MAINTAINENCE- PREVNAR 13 GIVEN 10/6/20, PNEUMOVAX IN 1 YR, had 2 covid boosters, had new Covid Booster,  Shingrix  completed..  iRBBB/LAHB- stable

## 2024-03-15 LAB
ALBUMIN SERPL ELPH-MCNC: 4.7 G/DL
ALP BLD-CCNC: 49 U/L
ALT SERPL-CCNC: 34 U/L
ANION GAP SERPL CALC-SCNC: 17 MMOL/L
APPEARANCE: CLEAR
AST SERPL-CCNC: 34 U/L
BACTERIA: NEGATIVE /HPF
BASOPHILS # BLD AUTO: 0.04 K/UL
BASOPHILS NFR BLD AUTO: 0.6 %
BILIRUB SERPL-MCNC: 0.6 MG/DL
BILIRUBIN URINE: NEGATIVE
BLOOD URINE: NEGATIVE
BUN SERPL-MCNC: 34 MG/DL
CALCIUM SERPL-MCNC: 9.8 MG/DL
CAST: 0 /LPF
CHLORIDE SERPL-SCNC: 99 MMOL/L
CHOLEST SERPL-MCNC: 117 MG/DL
CO2 SERPL-SCNC: 20 MMOL/L
COLOR: YELLOW
CREAT SERPL-MCNC: 1.48 MG/DL
CREAT SPEC-SCNC: 58 MG/DL
EGFR: 52 ML/MIN/1.73M2
EOSINOPHIL # BLD AUTO: 0.11 K/UL
EOSINOPHIL NFR BLD AUTO: 1.8 %
EPITHELIAL CELLS: 0 /HPF
ESTIMATED AVERAGE GLUCOSE: 206 MG/DL
GLUCOSE QUALITATIVE U: >=1000 MG/DL
GLUCOSE SERPL-MCNC: 130 MG/DL
HBA1C MFR BLD HPLC: 8.8 %
HCT VFR BLD CALC: 47.8 %
HDLC SERPL-MCNC: 45 MG/DL
HGB BLD-MCNC: 15.9 G/DL
IMM GRANULOCYTES NFR BLD AUTO: 0.3 %
KETONES URINE: NEGATIVE MG/DL
LDLC SERPL CALC-MCNC: 56 MG/DL
LEUKOCYTE ESTERASE URINE: NEGATIVE
LYMPHOCYTES # BLD AUTO: 0.71 K/UL
LYMPHOCYTES NFR BLD AUTO: 11.5 %
MAN DIFF?: NORMAL
MCHC RBC-ENTMCNC: 31.5 PG
MCHC RBC-ENTMCNC: 33.3 GM/DL
MCV RBC AUTO: 94.7 FL
MICROALBUMIN 24H UR DL<=1MG/L-MCNC: 54.8 MG/DL
MICROALBUMIN/CREAT 24H UR-RTO: 945 MG/G
MICROSCOPIC-UA: NORMAL
MONOCYTES # BLD AUTO: 0.39 K/UL
MONOCYTES NFR BLD AUTO: 6.3 %
NEUTROPHILS # BLD AUTO: 4.92 K/UL
NEUTROPHILS NFR BLD AUTO: 79.5 %
NITRITE URINE: NEGATIVE
NONHDLC SERPL-MCNC: 72 MG/DL
PH URINE: 5.5
PLATELET # BLD AUTO: 121 K/UL
POTASSIUM SERPL-SCNC: 4.9 MMOL/L
PROT SERPL-MCNC: 7.2 G/DL
PROTEIN URINE: 100 MG/DL
PSA SERPL-MCNC: 1.96 NG/ML
RBC # BLD: 5.05 M/UL
RBC # FLD: 12.7 %
RED BLOOD CELLS URINE: 0 /HPF
SODIUM SERPL-SCNC: 136 MMOL/L
SPECIFIC GRAVITY URINE: 1.02
TRIGL SERPL-MCNC: 80 MG/DL
UROBILINOGEN URINE: 0.2 MG/DL
WBC # FLD AUTO: 6.19 K/UL
WHITE BLOOD CELLS URINE: 0 /HPF

## 2024-03-18 RX ORDER — AMLODIPINE BESYLATE 5 MG/1
5 TABLET ORAL
Qty: 90 | Refills: 3 | Status: ACTIVE | COMMUNITY
Start: 2023-01-23 | End: 1900-01-01

## 2024-03-18 RX ORDER — VALSARTAN 160 MG/1
160 TABLET, COATED ORAL
Qty: 90 | Refills: 3 | Status: ACTIVE | COMMUNITY
Start: 2017-08-18 | End: 1900-01-01

## 2024-07-26 ENCOUNTER — RX RENEWAL (OUTPATIENT)
Age: 69
End: 2024-07-26

## 2024-09-16 ENCOUNTER — NON-APPOINTMENT (OUTPATIENT)
Age: 69
End: 2024-09-16

## 2024-09-16 ENCOUNTER — APPOINTMENT (OUTPATIENT)
Dept: HEART AND VASCULAR | Facility: CLINIC | Age: 69
End: 2024-09-16
Payer: MEDICARE

## 2024-09-16 VITALS
WEIGHT: 269 LBS | OXYGEN SATURATION: 96 % | SYSTOLIC BLOOD PRESSURE: 139 MMHG | HEART RATE: 87 BPM | TEMPERATURE: 97.7 F | BODY MASS INDEX: 34.52 KG/M2 | HEIGHT: 74 IN | DIASTOLIC BLOOD PRESSURE: 79 MMHG

## 2024-09-16 DIAGNOSIS — I10 ESSENTIAL (PRIMARY) HYPERTENSION: ICD-10-CM

## 2024-09-16 DIAGNOSIS — I25.10 ATHEROSCLEROTIC HEART DISEASE OF NATIVE CORONARY ARTERY W/OUT ANGINA PECTORIS: ICD-10-CM

## 2024-09-16 DIAGNOSIS — I45.2 BIFASCICULAR BLOCK: ICD-10-CM

## 2024-09-16 DIAGNOSIS — R93.1 ABNORMAL FINDINGS ON DIAGNOSTIC IMAGING OF HEART AND CORONARY CIRCULATION: ICD-10-CM

## 2024-09-16 DIAGNOSIS — E11.9 TYPE 2 DIABETES MELLITUS W/OUT COMPLICATIONS: ICD-10-CM

## 2024-09-16 DIAGNOSIS — E78.00 PURE HYPERCHOLESTEROLEMIA, UNSPECIFIED: ICD-10-CM

## 2024-09-16 PROCEDURE — 36415 COLL VENOUS BLD VENIPUNCTURE: CPT

## 2024-09-16 PROCEDURE — 99214 OFFICE O/P EST MOD 30 MIN: CPT | Mod: 25

## 2024-09-16 PROCEDURE — 93000 ELECTROCARDIOGRAM COMPLETE: CPT

## 2024-09-16 RX ORDER — TIRZEPATIDE 10 MG/.5ML
10 INJECTION, SOLUTION SUBCUTANEOUS
Qty: 12 | Refills: 1 | Status: ACTIVE | COMMUNITY
Start: 2024-09-16

## 2024-09-16 NOTE — PHYSICAL EXAM
[General Appearance - Well Developed] : well developed [Normal Appearance] : normal appearance [Well Groomed] : well groomed [General Appearance - Well Nourished] : well nourished [No Deformities] : no deformities [Normal Conjunctiva] : the conjunctiva exhibited no abnormalities [General Appearance - In No Acute Distress] : no acute distress [Eyelids - No Xanthelasma] : the eyelids demonstrated no xanthelasmas [Respiration, Rhythm And Depth] : normal respiratory rhythm and effort [Exaggerated Use Of Accessory Muscles For Inspiration] : no accessory muscle use [Auscultation Breath Sounds / Voice Sounds] : lungs were clear to auscultation bilaterally [Heart Rate And Rhythm] : heart rate and rhythm were normal [Heart Sounds] : normal S1 and S2 [Murmurs] : no murmurs present [Abdomen Soft] : soft [Abdomen Tenderness] : non-tender [Abdomen Mass (___ Cm)] : no abdominal mass palpated [Abnormal Walk] : normal gait [Gait - Sufficient For Exercise Testing] : the gait was sufficient for exercise testing [Cyanosis, Localized] : no localized cyanosis [Nail Clubbing] : no clubbing of the fingernails [Petechial Hemorrhages (___cm)] : no petechial hemorrhages [Skin Color & Pigmentation] : normal skin color and pigmentation [] : no rash [No Venous Stasis] : no venous stasis [Skin Lesions] : no skin lesions [No Skin Ulcers] : no skin ulcer [No Xanthoma] : no  xanthoma was observed [Oriented To Time, Place, And Person] : oriented to person, place, and time [Affect] : the affect was normal [Mood] : the mood was normal [No Anxiety] : not feeling anxious [FreeTextEntry1] : TRAVIS 7/22/20, Prostate mildly enlarged, G (-)

## 2024-09-16 NOTE — ASSESSMENT
[FreeTextEntry1] : DM: on max metformin and glipizide and long acting insulin, will f/u a1c, 2/2016 a1c 6.9.  Seeing an Endo, Dr Hooks.  Last  A1c 7.9 , 8.8.  Labs were drawn today in Office.  On Mounjaro from Endo.  ASHD-  Pt agrees to a CCS + 600 in 2020.  NL Nuc Sept 2020.  A1c 7.8. Good exercise tolerance.    HLD-  on Lipitor 40 mg.  Will add Zetia 10 mg   Labs were drawn today in Office.   HTN: uncontrolled today, goal <130/90 given CKD, review bw, if creatinine stable increase valsartan to 160mg and follow up in 2 weeks. Diet and exercise discussed in detail. orthostatics negative.  BP elevated here home readings variable 120s to 150.  Add weekly HCTZ 25 mg, increase to 2 x weekly 9/20/22.  Added Norvasc 2.5 mg today 1/23/23.  Increased to 5 mg 6/1/23.  BP remains elevated, increase Valsartan to 160 + 80 mg.  CKD: f/u BW, needs better BP control, 2/2016 creatinine 1.39 GFR 55. Cr 1.57, 1.51 in 2022.   Labs drawn with Endo and us.  Labs were drawn today in Office.  CA screening: colonoscopy approx 2015 Dr. Murray Greenfield, at [facility], normal. Mother had colon CA.  TRAVIS and PSA .  Updated with Dr Shiv Coburn in 2021 @ [facility]  HEALTH MAINTAINENCE- PREVNAR 13 GIVEN 10/6/20, PNEUMOVAX IN 1 YR, had 2 covid boosters, had new Covid Booster,  Shingrix  completed..  iRBBB/LAHB- stable.  1st degree AVB seen Sept 2024.

## 2024-09-16 NOTE — REASON FOR VISIT
Chief Complaint   Patient presents with     Edema     Derm Problem     slight improvement       Initial /70  Pulse 66  Temp 96.4  F (35.8  C) (Tympanic)  Resp 16  Wt 211 lb (95.7 kg)  BMI 28.62 kg/m2 Estimated body mass index is 28.62 kg/(m^2) as calculated from the following:    Height as of 1/10/18: 6' (1.829 m).    Weight as of this encounter: 211 lb (95.7 kg).  Medication Reconciliation: complete     Anabel Rinaldi CMA      
[FreeTextEntry1] : Follow up multiple chronic health problems   Endo- Dr Juan Hooks Renal- Dr Shukri Leonardo- Dr Rivera

## 2024-09-16 NOTE — HISTORY OF PRESENT ILLNESS
[FreeTextEntry1] : 66 yo male, last visit over year ago, here today for follow up. Hx of htn, dm, hld, ckd on medical therapy. Does home BP monitor majority >130/90. Denies CP, SOB, palpitations, orthopnea, LE swelling, dizziness, syncope. No formal exercise but walks daily, can walk up subway stairs- two flights, inclines without provoking any concerning cardiac sx. Reports one episode of dizziness from sitting to standing, no recurrence since, no ha, vision changes, limb weakness, focal neurological deficits.   Previous nuc >5 years ago with Dr. Saez, reports it was normal.   7/22/20  Last here 3 yrs ago, pt feels thickening of his tongue and cheeks. Has elevated insulin like growth factor(Acromegaly).  Followed by Dr Juan Hooks.  Must also consider Amyloidosis.  Home BPs are nl but a few are high.  Cr 1.57 5/17/22  w/u by Dr Hooks and ENT (-).  A1c 7.8, , Cr 1.50, gluc 204, K 5.4 9/20/22 BP up on once weekly HCTZ/ 1/23/23 A1c 7.8 and 7.9.  6/1/23 Wt unchanged, BP remains elevated 3/14/24 BP up 9/16/24 BP improved, will check labs

## 2024-09-17 LAB
ALBUMIN SERPL ELPH-MCNC: 4.5 G/DL
ALP BLD-CCNC: 50 U/L
ALT SERPL-CCNC: 22 U/L
ANION GAP SERPL CALC-SCNC: 13 MMOL/L
AST SERPL-CCNC: 21 U/L
BASOPHILS # BLD AUTO: 0.04 K/UL
BASOPHILS NFR BLD AUTO: 0.7 %
BILIRUB SERPL-MCNC: 0.4 MG/DL
BUN SERPL-MCNC: 27 MG/DL
CALCIUM SERPL-MCNC: 9.5 MG/DL
CHLORIDE SERPL-SCNC: 102 MMOL/L
CHOLEST SERPL-MCNC: 133 MG/DL
CO2 SERPL-SCNC: 23 MMOL/L
CREAT SERPL-MCNC: 1.5 MG/DL
EGFR: 51 ML/MIN/1.73M2
EOSINOPHIL # BLD AUTO: 0.12 K/UL
EOSINOPHIL NFR BLD AUTO: 2 %
ESTIMATED AVERAGE GLUCOSE: 163 MG/DL
GLUCOSE SERPL-MCNC: 84 MG/DL
HBA1C MFR BLD HPLC: 7.3 %
HCT VFR BLD CALC: 49.5 %
HDLC SERPL-MCNC: 46 MG/DL
HGB BLD-MCNC: 15.7 G/DL
IMM GRANULOCYTES NFR BLD AUTO: 0.2 %
LDLC SERPL CALC-MCNC: 70 MG/DL
LYMPHOCYTES # BLD AUTO: 1.41 K/UL
LYMPHOCYTES NFR BLD AUTO: 23.4 %
MAN DIFF?: NORMAL
MCHC RBC-ENTMCNC: 31.6 PG
MCHC RBC-ENTMCNC: 31.7 GM/DL
MCV RBC AUTO: 99.6 FL
MONOCYTES # BLD AUTO: 0.39 K/UL
MONOCYTES NFR BLD AUTO: 6.5 %
NEUTROPHILS # BLD AUTO: 4.05 K/UL
NEUTROPHILS NFR BLD AUTO: 67.2 %
NONHDLC SERPL-MCNC: 87 MG/DL
PLATELET # BLD AUTO: 119 K/UL
POTASSIUM SERPL-SCNC: 4.7 MMOL/L
PROT SERPL-MCNC: 7.1 G/DL
RBC # BLD: 4.97 M/UL
RBC # FLD: 12.7 %
SODIUM SERPL-SCNC: 138 MMOL/L
TRIGL SERPL-MCNC: 86 MG/DL
WBC # FLD AUTO: 6.02 K/UL

## 2024-09-21 ENCOUNTER — RX RENEWAL (OUTPATIENT)
Age: 69
End: 2024-09-21

## 2024-12-26 ENCOUNTER — RX RENEWAL (OUTPATIENT)
Age: 69
End: 2024-12-26

## 2025-02-14 ENCOUNTER — RX RENEWAL (OUTPATIENT)
Age: 70
End: 2025-02-14

## 2025-03-03 ENCOUNTER — APPOINTMENT (OUTPATIENT)
Dept: HEART AND VASCULAR | Facility: CLINIC | Age: 70
End: 2025-03-03
Payer: MEDICARE

## 2025-03-03 VITALS
SYSTOLIC BLOOD PRESSURE: 114 MMHG | BODY MASS INDEX: 33.24 KG/M2 | HEIGHT: 74 IN | TEMPERATURE: 98 F | WEIGHT: 259 LBS | OXYGEN SATURATION: 94 % | HEART RATE: 96 BPM | DIASTOLIC BLOOD PRESSURE: 76 MMHG

## 2025-03-03 DIAGNOSIS — I45.2 BIFASCICULAR BLOCK: ICD-10-CM

## 2025-03-03 DIAGNOSIS — I10 ESSENTIAL (PRIMARY) HYPERTENSION: ICD-10-CM

## 2025-03-03 DIAGNOSIS — R00.2 PALPITATIONS: ICD-10-CM

## 2025-03-03 DIAGNOSIS — R93.1 ABNORMAL FINDINGS ON DIAGNOSTIC IMAGING OF HEART AND CORONARY CIRCULATION: ICD-10-CM

## 2025-03-03 DIAGNOSIS — E78.00 PURE HYPERCHOLESTEROLEMIA, UNSPECIFIED: ICD-10-CM

## 2025-03-03 DIAGNOSIS — I25.10 ATHEROSCLEROTIC HEART DISEASE OF NATIVE CORONARY ARTERY W/OUT ANGINA PECTORIS: ICD-10-CM

## 2025-03-03 PROCEDURE — 93242 EXT ECG>48HR<7D RECORDING: CPT

## 2025-03-03 PROCEDURE — 99214 OFFICE O/P EST MOD 30 MIN: CPT

## 2025-03-03 RX ORDER — VALSARTAN 160 MG/1
160 TABLET, COATED ORAL
Qty: 90 | Refills: 3 | Status: ACTIVE | COMMUNITY
Start: 2025-03-03

## 2025-03-03 RX ORDER — FINERENONE 20 MG/1
TABLET, FILM COATED ORAL
Refills: 0 | Status: ACTIVE | COMMUNITY

## 2025-03-26 PROCEDURE — 93244 EXT ECG>48HR<7D REV&INTERPJ: CPT

## 2025-05-07 ENCOUNTER — RX RENEWAL (OUTPATIENT)
Age: 70
End: 2025-05-07

## 2025-07-31 ENCOUNTER — RX RENEWAL (OUTPATIENT)
Age: 70
End: 2025-07-31

## 2025-08-14 ENCOUNTER — RX RENEWAL (OUTPATIENT)
Age: 70
End: 2025-08-14

## 2025-09-15 ENCOUNTER — NON-APPOINTMENT (OUTPATIENT)
Age: 70
End: 2025-09-15

## 2025-09-15 ENCOUNTER — APPOINTMENT (OUTPATIENT)
Dept: HEART AND VASCULAR | Facility: CLINIC | Age: 70
End: 2025-09-15
Payer: MEDICARE

## 2025-09-15 VITALS
OXYGEN SATURATION: 93 % | TEMPERATURE: 98.3 F | SYSTOLIC BLOOD PRESSURE: 130 MMHG | WEIGHT: 256.99 LBS | HEART RATE: 89 BPM | HEIGHT: 74 IN | DIASTOLIC BLOOD PRESSURE: 65 MMHG | BODY MASS INDEX: 32.98 KG/M2

## 2025-09-15 DIAGNOSIS — I10 ESSENTIAL (PRIMARY) HYPERTENSION: ICD-10-CM

## 2025-09-15 DIAGNOSIS — I45.2 BIFASCICULAR BLOCK: ICD-10-CM

## 2025-09-15 DIAGNOSIS — E78.00 PURE HYPERCHOLESTEROLEMIA, UNSPECIFIED: ICD-10-CM

## 2025-09-15 DIAGNOSIS — I25.10 ATHEROSCLEROTIC HEART DISEASE OF NATIVE CORONARY ARTERY W/OUT ANGINA PECTORIS: ICD-10-CM

## 2025-09-15 DIAGNOSIS — E11.9 TYPE 2 DIABETES MELLITUS W/OUT COMPLICATIONS: ICD-10-CM

## 2025-09-15 PROCEDURE — 99214 OFFICE O/P EST MOD 30 MIN: CPT

## 2025-09-15 PROCEDURE — 36415 COLL VENOUS BLD VENIPUNCTURE: CPT

## 2025-09-15 PROCEDURE — 93000 ELECTROCARDIOGRAM COMPLETE: CPT

## 2025-09-15 RX ORDER — DULAGLUTIDE 3 MG/.5ML
3 INJECTION, SOLUTION SUBCUTANEOUS
Qty: 13 | Refills: 0 | Status: ACTIVE | COMMUNITY
Start: 2025-09-15

## 2025-09-16 LAB — PSA SERPL-MCNC: 2.13 NG/ML
